# Patient Record
Sex: FEMALE | Race: WHITE
[De-identification: names, ages, dates, MRNs, and addresses within clinical notes are randomized per-mention and may not be internally consistent; named-entity substitution may affect disease eponyms.]

---

## 2019-04-08 ENCOUNTER — HOSPITAL ENCOUNTER (INPATIENT)
Dept: HOSPITAL 10 - L-D | Age: 24
LOS: 2 days | Discharge: HOME | End: 2019-04-10
Attending: OBSTETRICS & GYNECOLOGY | Admitting: OBSTETRICS & GYNECOLOGY
Payer: MEDICAID

## 2019-04-08 ENCOUNTER — HOSPITAL ENCOUNTER (INPATIENT)
Dept: HOSPITAL 91 - OBT | Age: 24
LOS: 2 days | Discharge: HOME | End: 2019-04-10
Payer: MEDICAID

## 2019-04-08 VITALS — SYSTOLIC BLOOD PRESSURE: 124 MMHG | HEART RATE: 68 BPM | RESPIRATION RATE: 17 BRPM | DIASTOLIC BLOOD PRESSURE: 84 MMHG

## 2019-04-08 VITALS
WEIGHT: 174.83 LBS | WEIGHT: 174.83 LBS | BODY MASS INDEX: 30.98 KG/M2 | HEIGHT: 63 IN | BODY MASS INDEX: 30.98 KG/M2 | HEIGHT: 63 IN

## 2019-04-08 VITALS — SYSTOLIC BLOOD PRESSURE: 122 MMHG | HEART RATE: 85 BPM | RESPIRATION RATE: 20 BRPM | DIASTOLIC BLOOD PRESSURE: 73 MMHG

## 2019-04-08 DIAGNOSIS — Z3A.39: ICD-10-CM

## 2019-04-08 LAB
ADD MAN DIFF?: NO
BASOPHIL #: 0.1 10^3/UL (ref 0–0.1)
BASOPHILS %: 0.6 % (ref 0–2)
EOSINOPHILS #: 0.1 10^3/UL (ref 0–0.5)
EOSINOPHILS %: 0.6 % (ref 0–7)
HEMATOCRIT: 38.8 % (ref 37–47)
HEMOGLOBIN: 13.4 G/DL (ref 12–16)
IMMATURE GRANS #M: 0.05 10^3/UL (ref 0–0.03)
IMMATURE GRANS % (M): 0.5 % (ref 0–0.43)
INR: 0.81
LYMPHOCYTES #: 2.1 10^3/UL (ref 0.8–2.9)
LYMPHOCYTES %: 19 % (ref 15–51)
MEAN CORPUSCULAR HEMOGLOBIN: 31.2 PG (ref 29–33)
MEAN CORPUSCULAR HGB CONC: 34.5 G/DL (ref 32–37)
MEAN CORPUSCULAR VOLUME: 90.2 FL (ref 82–101)
MEAN PLATELET VOLUME: 11.1 FL (ref 7.4–10.4)
MONOCYTE #: 0.5 10^3/UL (ref 0.3–0.9)
MONOCYTES %: 4.4 % (ref 0–11)
NEUTROPHIL #: 8.1 10^3/UL (ref 1.6–7.5)
NEUTROPHILS %: 74.9 % (ref 39–77)
NUCLEATED RED BLOOD CELLS #: 0 10^3/UL (ref 0–0)
NUCLEATED RED BLOOD CELLS%: 0 /100WBC (ref 0–0)
PARTIAL THROMBOPLASTIN TIME: 23.6 SEC (ref 23–35)
PLATELET COUNT: 206 10^3/UL (ref 140–415)
PROTIME: 11.3 SEC (ref 11.9–14.9)
PT RATIO: 0.9
RAPID PLASMA REAGIN: NONREACTIVE
RED BLOOD COUNT: 4.3 10^6/UL (ref 4.2–5.4)
RED CELL DISTRIBUTION WIDTH: 13.4 % (ref 11.5–14.5)
WHITE BLOOD COUNT: 10.8 10^3/UL (ref 4.8–10.8)

## 2019-04-08 PROCEDURE — 86900 BLOOD TYPING SEROLOGIC ABO: CPT

## 2019-04-08 PROCEDURE — 85730 THROMBOPLASTIN TIME PARTIAL: CPT

## 2019-04-08 PROCEDURE — G0463 HOSPITAL OUTPT CLINIC VISIT: HCPCS

## 2019-04-08 PROCEDURE — 86592 SYPHILIS TEST NON-TREP QUAL: CPT

## 2019-04-08 PROCEDURE — 90715 TDAP VACCINE 7 YRS/> IM: CPT

## 2019-04-08 PROCEDURE — 86850 RBC ANTIBODY SCREEN: CPT

## 2019-04-08 PROCEDURE — 86901 BLOOD TYPING SEROLOGIC RH(D): CPT

## 2019-04-08 PROCEDURE — 76818 FETAL BIOPHYS PROFILE W/NST: CPT

## 2019-04-08 PROCEDURE — 85610 PROTHROMBIN TIME: CPT

## 2019-04-08 PROCEDURE — 85025 COMPLETE CBC W/AUTO DIFF WBC: CPT

## 2019-04-08 RX ADMIN — PYRIDOXINE HYDROCHLORIDE SCH MLS/HR: 100 INJECTION, SOLUTION INTRAMUSCULAR; INTRAVENOUS at 21:33

## 2019-04-08 RX ADMIN — BUTORPHANOL TARTRATE 1 MG: 2 INJECTION, SOLUTION INTRAMUSCULAR; INTRAVENOUS at 16:19

## 2019-04-08 RX ADMIN — PYRIDOXINE HYDROCHLORIDE 1 MLS/HR: 100 INJECTION, SOLUTION INTRAMUSCULAR; INTRAVENOUS at 10:58

## 2019-04-08 RX ADMIN — AMPICILLIN 1 MLS/HR: 2 INJECTION, POWDER, FOR SOLUTION INTRAVENOUS at 14:58

## 2019-04-08 RX ADMIN — PYRIDOXINE HYDROCHLORIDE 1 MLS/HR: 100 INJECTION, SOLUTION INTRAMUSCULAR; INTRAVENOUS at 21:33

## 2019-04-08 RX ADMIN — Medication 1 MLS/HR: at 21:33

## 2019-04-08 RX ADMIN — Medication 1 MLS/HR: at 18:57

## 2019-04-08 RX ADMIN — Medication 1 MLS/HR: at 21:29

## 2019-04-08 RX ADMIN — PYRIDOXINE HYDROCHLORIDE 1 MLS/HR: 100 INJECTION, SOLUTION INTRAMUSCULAR; INTRAVENOUS at 18:09

## 2019-04-08 RX ADMIN — PYRIDOXINE HYDROCHLORIDE SCH MLS/HR: 100 INJECTION, SOLUTION INTRAMUSCULAR; INTRAVENOUS at 18:09

## 2019-04-08 RX ADMIN — PYRIDOXINE HYDROCHLORIDE 1 MLS/HR: 100 INJECTION, SOLUTION INTRAMUSCULAR; INTRAVENOUS at 20:25

## 2019-04-08 RX ADMIN — AMPICILLIN 1 MLS/HR: 1 INJECTION, POWDER, FOR SOLUTION INTRAMUSCULAR; INTRAVENOUS at 18:58

## 2019-04-08 RX ADMIN — PYRIDOXINE HYDROCHLORIDE SCH MLS/HR: 100 INJECTION, SOLUTION INTRAMUSCULAR; INTRAVENOUS at 20:25

## 2019-04-08 RX ADMIN — Medication SCH MLS/HR: at 21:29

## 2019-04-08 RX ADMIN — PYRIDOXINE HYDROCHLORIDE SCH MLS/HR: 100 INJECTION, SOLUTION INTRAMUSCULAR; INTRAVENOUS at 10:58

## 2019-04-08 RX ADMIN — Medication SCH MLS/HR: at 18:57

## 2019-04-08 NOTE — PAC
Date/Time of Note


Date/Time of Note


DATE: 4/8/19 


TIME: 20:14





Post-Anesthesia Notes


Post-Anesthesia Note


Last documented vital signs





Vital Signs


  Date      Temp  Pulse  Resp  B/P (MAP)   Pulse Ox  O2          O2 Flow    FiO2


Time                                                 Delivery    Rate


    4/8/19  97.9     85    20      122/73        98


     20:38                           (89)





Activity:  WNL


Respiratory function:  WNL


Cardiovascular function:  WNL


Mental status:  Baseline


Pain reasonably controlled:  Yes


Hydration appropriate:  Yes


Nausea/Vomiting absent:  No











MIREYA LUNA MD             Apr 8, 2019 20:15

## 2019-04-08 NOTE — LDN
Date/Time of Note


Date/Time of Note


DATE: 19 


TIME: 21:26





Delivery Summary


 of a vaible baby girl weighing 3800 grams or 8# 6 oz, 21" long, and with 


Apgars of 9/9.


Weeks of Gestation


39w 2d


Placenta Delivered:  Spontaneously


Meconium:  none


Episiotomy:  No


Perineal laceration:  0


Anesthesia type:  Epidural


Estimated blood loss:  100


Sponge & Needle done & correct:  Yes


All needle counts correct:  Yes


Any foreign bodies felt in the:  No (vagina)





Infant Delivery Information


Sex


Infant Sex:  female





Apgars


1 Minute:  9


5 Minute:  9





Suctioning


Nose & mouth suctioned at danis:  Yes


Delee suction performed:  No





Umbilical Cord


Umbilical cord with:  3 Vessels


Cord presentations:  no nuchal cord


Cord Blood was obtained:  Yes





Mother & Baby Disposition


Disposition


Mom & Baby to Maternity; Good:  Yes


Baby to NICU:  No











RODNEY CONNORS MD              2019 21:28

## 2019-04-08 NOTE — PREAC
Date/Time of Note


Date/Time of Note


DATE: 4/8/19 


TIME: 19:41





Anesthesia Eval and Record


Evaluation


Time Pre-Procedure Interview


DATE: 4/8/19 


TIME: 19:40


Age


23


Sex


female


NPO:  8 hrs


Preoperative diagnosis


labor pain


Planned procedure


epidural





Past Medical History


Past Medical History:  Includes


Pulm:  Asthma


Pregnancy:  Gestational diabetes (39)





Surgery & Anesthesia Issues


No known issue





Meds


Anticoagulation:  No


Beta Blocker within 24 hr:  No


Reason Beta Blocker not given:  Pt. not on B-Blocker


Reported Medications


Pnv95/Ferrous Fumarate/FA (Prenatal Formula Tablet) 1 Each Tablet, 1 EACH PO 


DAILY, TAB


   4/8/19





Current Medications


Lactated Ringer's 1,000 ml @  125 mls/hr Q8H IV  Last administered on 4/8/19at 


18:09; Admin Dose 125 MLS/HR;  Start 4/8/19 at 10:02


Butorphanol Tartrate (Stadol) 2 mg Q2H  PRN IV .PAIN Last administered on 


4/8/19at 16:19; Admin Dose 2 MG;  Start 4/8/19 at 10:30


Lidocaine (Xylocaine 1% (Mpf)) 30 ml ONCE PRN INJ .EPISIOTOMY;  Start 4/8/19 at 


10:30


Oxytocin/Lactated Ringer's 500 ml @  500 mls/hr ONCE POST PARTUM IV ;  Start 


4/8/19 at 10:30


Oxytocin/Lactated Ringer's 500 ml @  125 mls/hr POST PARTUM IV ;  Start 4/8/19 


at 10:30


Ibuprofen (Motrin) 600 mg ONCE PRN PO .PAIN 1-5;  Start 4/8/19 at 10:30


Oxytocin/Lactated Ringer's 500 ml @ 0 mls/hr ONCE PRN IV .VAGINAL BLEEDING;  


Start 4/8/19 at 10:30


Methylergonovine Maleate (Methergine) 0.2 mg ONCE PRN IM .VAGINAL BLEEDING;  


Start 4/8/19 at 10:30


Carboprost Tromethamine (Hemabate) 250 mcg ONCE PRN IM .VAGINAL BLEEDING;  Start


4/8/19 at 10:30


Misoprostol (Cytotec) 1,000 mcg ONCE PRN MI .VAGINAL BLEEDING;  Start 4/8/19 at 


10:30


Albuterol (Ventolin Hfa) 2 puff Q4H RESP THERAPY  PRN INH SHORTNESS OF BREATH;  


Start 4/8/19 at 11:00


Ampicillin 50 ml @  100 mls/hr Q4 IVPB  Last administered on 4/8/19at 18:58; 


Admin Dose 100 MLS/HR;  Start 4/8/19 at 19:00


Oxytocin/Lactated Ringer's 500 ml @ 0 mls/hr Q0M IV ;  Start 4/8/19 at 19:30


Oxytocin/Lactated Ringer's 500 ml @ 0 mls/hr FOR AUGMENTATION IV ;  Start 4/8/19


at 19:30


Meds reviewed:  Yes





Allergies


Coded Allergies:  


     No Known Drug Allergies (Verified  Allergy, Unknown, 4/8/19)


Allergies Reviewed:  Yes





Labs/Studies


Labs Reviewed:  Reviewed by anesthesiologist


Result Diagram:  


4/8/19 1045





Laboratory Tests


4/8/19 10:45











Blood Bank


                   Test
                         4/8/19
10:45


                   Antibody Screen               NEGATIVE


                   Blood Type                    B POSITIVE


                   Rh Immune Globulin Candidate  NO





Pregnancy test:  Positive


Studies:  ECG (n/a), CXR (n/a)





Pre-procedure Exam


Last vitals





Vital Signs


  Date      Temp  Pulse  Resp  B/P (MAP)   Pulse Ox  O2          O2 Flow    FiO2


Time                                                 Delivery    Rate


    4/8/19  97.9     85    20      122/73


     09:38                           (89)





Airway:  Adequate mouth opening


Mallampati:  Mallampati I


Teeth:  Normal


Lung:  Normal


Heart:  Normal





ASA Physical Status


ASA physical status:  2


Emergency:  None





Planned Anesthetic


Neuraxial:  Epidural





Pre-operative Attestations


Prior to commencing anesthesia and surgery, the patient was re-evaluated, there 


was verification of:


*The patient's identity


*The results of appropriate recent lab work and preoperative vital signs


*The above evaluation not changing prior to induction


*Anesthetic plan, risk benefits, alternative and complications discussed with 


patient/family; questions answered; patient/family understands, accepts and 


wishes to proceed.











MIREYA LUNA MD             Apr 8, 2019 19:42

## 2019-04-08 NOTE — HP
Date/Time of Note


Date/Time of Note


DATE: 19 


TIME: 21:28





OB - History


Hx of Present Pregnancy


Free Text/Dictation


23 y.o.  with an IUP at 39w 2d came in with spontaneous rupture of 


membranes and a cervical exam of 80%/3-4 cm/-3.


Estimated Due Date:  2019


:  3


Para:  2


Prenatal Care:  Good Care


Ultrasounds:  Normal mid trimester US


Obstetrical Complications:  None


Medical Complications:  Respiratory (asthma)





Past Family/Social History


*


Past Medical, Surgical, Family and Obstetric Histories reviewed from prenatal 


chart.


Blood Type:  B+


Rubella:  immune


RPR/VDRL:  Negative


GBS Status:  Negative


HBsAG:  Negative





OB  Admission Exam


Vital Signs


Vital Signs





Vital Signs


  Date      Temp  Pulse  Resp  B/P (MAP)   Pulse Ox  O2          O2 Flow    FiO2


Time                                                 Delivery    Rate


    19  97.9     85    20      122/73


     09:38                           (89)








Physical Exam


HEENT:  WNL


Heart:  Rhythm Normal


Lungs:  Clear


Abdomen:  WNL


Extremities:  Normal


Reflexes:  Normal


Cervical Dilatation:  3cm


Effacement:  Other (80%)


Station:  -3


Membranes:  Ruptured


Amniotic Fluid:  Clear


Fetal Heart Rate:  140's


Accelerations:  Accelerations Present


Decelerations:  No Decelerations


Varibility:  Moderate


Contractions on Admission:  >10 Minutes Apart


Intensity:  Mild


Last 72 hours Lab Results


                                    CBC & BMP


19 10:45











OB  Assessment/Plan


Reason for admission:  rupture of membranes


Plan:  Expectant Management











RODNEY CONNORS MD              2019 21:33

## 2019-04-09 VITALS — RESPIRATION RATE: 16 BRPM | HEART RATE: 74 BPM | SYSTOLIC BLOOD PRESSURE: 104 MMHG | DIASTOLIC BLOOD PRESSURE: 65 MMHG

## 2019-04-09 VITALS — DIASTOLIC BLOOD PRESSURE: 67 MMHG | SYSTOLIC BLOOD PRESSURE: 99 MMHG | HEART RATE: 68 BPM | RESPIRATION RATE: 19 BRPM

## 2019-04-09 VITALS — DIASTOLIC BLOOD PRESSURE: 77 MMHG | HEART RATE: 73 BPM | SYSTOLIC BLOOD PRESSURE: 117 MMHG | RESPIRATION RATE: 17 BRPM

## 2019-04-09 VITALS — HEART RATE: 87 BPM | DIASTOLIC BLOOD PRESSURE: 72 MMHG | SYSTOLIC BLOOD PRESSURE: 111 MMHG | RESPIRATION RATE: 16 BRPM

## 2019-04-09 VITALS — SYSTOLIC BLOOD PRESSURE: 100 MMHG | RESPIRATION RATE: 16 BRPM | DIASTOLIC BLOOD PRESSURE: 53 MMHG | HEART RATE: 87 BPM

## 2019-04-09 LAB
ADD MAN DIFF?: NO
BASOPHIL #: 0.1 10^3/UL (ref 0–0.1)
BASOPHILS %: 0.5 % (ref 0–2)
EOSINOPHILS #: 0.1 10^3/UL (ref 0–0.5)
EOSINOPHILS %: 0.5 % (ref 0–7)
HEMATOCRIT: 35.4 % (ref 37–47)
HEMOGLOBIN: 12.2 G/DL (ref 12–16)
IMMATURE GRANS #M: 0.06 10^3/UL (ref 0–0.03)
IMMATURE GRANS % (M): 0.5 % (ref 0–0.43)
LYMPHOCYTES #: 2 10^3/UL (ref 0.8–2.9)
LYMPHOCYTES %: 15.3 % (ref 15–51)
MEAN CORPUSCULAR HEMOGLOBIN: 31.2 PG (ref 29–33)
MEAN CORPUSCULAR HGB CONC: 34.5 G/DL (ref 32–37)
MEAN CORPUSCULAR VOLUME: 90.5 FL (ref 82–101)
MEAN PLATELET VOLUME: 11.7 FL (ref 7.4–10.4)
MONOCYTE #: 0.8 10^3/UL (ref 0.3–0.9)
MONOCYTES %: 6.2 % (ref 0–11)
NEUTROPHIL #: 9.8 10^3/UL (ref 1.6–7.5)
NEUTROPHILS %: 77 % (ref 39–77)
NUCLEATED RED BLOOD CELLS #: 0 10^3/UL (ref 0–0)
NUCLEATED RED BLOOD CELLS%: 0 /100WBC (ref 0–0)
PLATELET COUNT: 158 10^3/UL (ref 140–415)
RED BLOOD COUNT: 3.91 10^6/UL (ref 4.2–5.4)
RED CELL DISTRIBUTION WIDTH: 13.2 % (ref 11.5–14.5)
WHITE BLOOD COUNT: 12.8 10^3/UL (ref 4.8–10.8)

## 2019-04-09 RX ADMIN — IBUPROFEN 1 MG: 600 TABLET ORAL at 00:26

## 2019-04-09 RX ADMIN — IBUPROFEN SCH MG: 600 TABLET ORAL at 05:35

## 2019-04-09 RX ADMIN — IBUPROFEN SCH MG: 600 TABLET ORAL at 11:54

## 2019-04-09 RX ADMIN — IBUPROFEN 1 MG: 600 TABLET ORAL at 23:38

## 2019-04-09 RX ADMIN — IBUPROFEN 1 MG: 600 TABLET ORAL at 05:35

## 2019-04-09 RX ADMIN — PYRIDOXINE HYDROCHLORIDE SCH MLS/HR: 100 INJECTION, SOLUTION INTRAMUSCULAR; INTRAVENOUS at 05:33

## 2019-04-09 RX ADMIN — Medication PRN APPLIC: at 00:26

## 2019-04-09 RX ADMIN — IBUPROFEN SCH MG: 600 TABLET ORAL at 17:17

## 2019-04-09 RX ADMIN — MEASLES, MUMPS, AND RUBELLA VIRUS VACCINE LIVE 1 ML: 1000; 12500; 1000 INJECTION, POWDER, LYOPHILIZED, FOR SUSPENSION SUBCUTANEOUS at 15:17

## 2019-04-09 RX ADMIN — IBUPROFEN 1 MG: 600 TABLET ORAL at 17:17

## 2019-04-09 RX ADMIN — PYRIDOXINE HYDROCHLORIDE 1 MLS/HR: 100 INJECTION, SOLUTION INTRAMUSCULAR; INTRAVENOUS at 05:33

## 2019-04-09 RX ADMIN — Medication 1 APPLIC: at 17:16

## 2019-04-09 RX ADMIN — Medication PRN APPLIC: at 17:16

## 2019-04-09 RX ADMIN — IBUPROFEN SCH MG: 600 TABLET ORAL at 00:26

## 2019-04-09 RX ADMIN — IBUPROFEN 1 MG: 600 TABLET ORAL at 11:54

## 2019-04-09 RX ADMIN — IBUPROFEN SCH MG: 600 TABLET ORAL at 23:38

## 2019-04-09 RX ADMIN — Medication 1 APPLIC: at 00:26

## 2019-04-09 NOTE — PN
Date/Time of Note


Date/Time of Note


DATE: 4/9/19 


TIME: 19:00





Assessment/Plan


VTE Prophylaxis


Risk score (from Ns)>0 risk:  1


SCD applied (from Ns):  No


SCD contraindicated:  low risk/ambulating


Pharmacological prophylaxis:  NA/contraindicated


Pharm contraindication:  low risk/ambulating





Lines/Catheters


IV Catheter Type (from [facility]):  Peripheral IV





Assessment/Plan


Assessment/Plan


POST PARTUM DAY 1


HOME TOMORROW 


RETURN TO CLINIC IN 2 WEEKS 


CONTINUE WITH VITAMINS OD AND FERROUS SULFATE PO TID


DIET AS ADVISED 


COUNSELED


INSTRUCTED


CALL OFFICE IF THERE IS ANY PROBLEMS OR CONCERN


Result Diagram:  


4/9/19 0728





Results 24hrs





Laboratory Tests


               Test
                                4/9/19
07:28


               White Blood Count                         12.8  H


               Red Blood Count                           3.91  L


               Hemoglobin                                 12.2


               Hematocrit                                35.4  L


               Mean Corpuscular Volume                    90.5


               Mean Corpuscular Hemoglobin                31.2


               Mean Corpuscular Hemoglobin
Concent       34.5  



               Red Cell Distribution Width                13.2


               Platelet Count                             158  #


               Mean Platelet Volume                      11.7  H


               Immature Granulocytes %                  0.500  H


               Neutrophils %                              77.0


               Lymphocytes %                              15.3


               Monocytes %                                 6.2


               Eosinophils %                               0.5


               Basophils %                                 0.5


               Nucleated Red Blood Cells %                 0.0


               Immature Granulocytes #                  0.060  H


               Neutrophils #                              9.8  H


               Lymphocytes #                               2.0


               Monocytes #                                 0.8


               Eosinophils #                               0.1


               Basophils #                                 0.1


               Nucleated Red Blood Cells #                 0.0








Subjective


24 Hr Interval Summary


Free Text/Dictation


FEELS GOOD, GOOD URINE OUTPUT, GOOD BOWEL MOVEMENT





Exam/Review of Systems


Exam


Vitals





Vital Signs


  Date      Temp  Pulse  Resp  B/P (MAP)   Pulse Ox  O2          O2 Flow    FiO2


Time                                                 Delivery    Rate


    4/9/19  98.2     87    16      100/53            Room Air


     15:45                           (69)








Intake and Output





4/8/19 4/8/19 4/9/19





1515:00


23:00


07:00





IntakeIntake Total


375 ml


700 ml


200 ml





OutputOutput Total


1135 ml


1100 ml





BalanceBalance


375 ml


-435 ml


-900 ml











Exam


VITAL SIGNS STABLE: YES


AFEBRILE: YES


BREAST NOT ENGORGED, NON-TENDER, NO APPRECIABLE MASS: YES


LUNGS CLEAR, NO RALES, WHEEZES, RHONCHI: YES


SINUS RHYTHM WITHOUT MURMUR: YES


ABDOMEN: NON-TENDER


FUNDUS: BELOW UMBILICUS


BOWEL SOUNDS: PRESENT


UTERUS: FIRM


INTACT PERINEUM: YES


LOCHIA: LIGHT


DEEP TENDON REFLEXES: 0


EXTREMITIES: NO CALF TENDERNESS


EDEMA SCALE: NONE





Results


Results 24hrs





Laboratory Tests


               Test
                                4/9/19
07:28


               White Blood Count                         12.8  H


               Red Blood Count                           3.91  L


               Hemoglobin                                 12.2


               Hematocrit                                35.4  L


               Mean Corpuscular Volume                    90.5


               Mean Corpuscular Hemoglobin                31.2


               Mean Corpuscular Hemoglobin
Concent       34.5  



               Red Cell Distribution Width                13.2


               Platelet Count                             158  #


               Mean Platelet Volume                      11.7  H


               Immature Granulocytes %                  0.500  H


               Neutrophils %                              77.0


               Lymphocytes %                              15.3


               Monocytes %                                 6.2


               Eosinophils %                               0.5


               Basophils %                                 0.5


               Nucleated Red Blood Cells %                 0.0


               Immature Granulocytes #                  0.060  H


               Neutrophils #                              9.8  H


               Lymphocytes #                               2.0


               Monocytes #                                 0.8


               Eosinophils #                               0.1


               Basophils #                                 0.1


               Nucleated Red Blood Cells #                 0.0








Medications


Medication





Current Medications


Ibuprofen (Motrin) 600 mg Q6 PO  Last administered on 4/9/19at 17:17; Admin Dose


600 MG;  Start 4/9/19 at 00:00


Acetaminophen/ Hydrocodone Bitart (Norco (5/325)) 1 tab Q4H  PRN PO .PAIN 1-5;  


Start 4/8/19 at 22:00


Lanolin (Lanolin Hpa) 1 applic BEDSIDE MEDICATION  PRN TOP .NIPPLES Last 


administered on 4/9/19at 17:16; Admin Dose 1 APPLIC;  Start 4/8/19 at 22:00


Diphtheria/ Tetanus/Acell Pertussis (Adacel) 0.5 ml ONCE ONCE IM* ;  Start 


4/10/19 at 09:00;  Stop 4/10/19 at 09:01


Oxytocin/Lactated Ringer's 500 ml @ 0 mls/hr ONCE PRN IV .VAGINAL BLEEDING;  


Start 4/8/19 at 22:00


Methylergonovine Maleate (Methergine) 0.2 mg ONCE PRN IM .VAGINAL BLEEDING;  


Start 4/8/19 at 22:00


Carboprost Tromethamine (Hemabate) 250 mcg ONCE PRN IM .VAGINAL BLEEDING;  Start


4/8/19 at 22:00


Misoprostol (Cytotec) 1,000 mcg ONCE PRN RI .VAGINAL BLEEDING;  Start 4/8/19 at 


22:00











YUAN HOSKINS MD              Apr 9, 2019 19:01

## 2019-04-09 NOTE — PREOPHP
DATE OF ADMISSION: 2019

 

HISTORY OF PRESENT ILLNESS:  This is a 23-year-old lady,  3, para 2, EDC 2019 at 39 and 
2/7 weeks, admitted to labor and delivery area in labor.  She had a spontaneous ruptured bag of water
 at 3:00 a.m. on 2019 followed by mild irregular contraction.  She had prenatal care at Dr. Tenorio
's office at [facility] and the prenatal care was uneventful.

 

PAST PERSONAL HISTORY:  No history of diabetes, TB, asthma.

 

ALLERGIES:  NO ALLERGIES.

 

SOCIAL HISTORY:  The patient does not smoke.  She does not drink.

 

MEDICATIONS:  She does not take any drugs except her iron and vitamins.

 

GYNECOLOGIC HISTORY:  She had menarche at the age of 13, every 28 days interval, 3 to 4 days duration
, and moderate in amount.

 

FAMILY HISTORY:  Noncontributory.

 

OBSTETRICAL HISTORY:  She is  3, para 2.  Her first delivery was in , second in .  All
 normal deliveries.  The largest baby weighed 7 pounds.

 

REVIEW OF SYSTEMS:

CARDIOVASCULAR:  No chest pains.

RESPIRATORY:  No cough.

GASTROINTESTINAL:  No diarrhea, no vomiting.

GENITOURINARY:  No dysuria.

 

PHYSICAL EXAMINATION:

GENERAL:  Reveals a conscious, coherent lady and in no acute distress.

VITAL SIGNS:  Her blood pressure 120/80, pulse rate 80 per minute, respirations 16 per minute.

BREASTS, HEART AND LUNGS:  Within normal limits.

ABDOMEN:  Soft, fundic height 36 cm.  Fetal heart tones 140 per minute.

PELVIC:  Done by me at 1:22 p.m. on 2019 revealed the cervix to be 5 cm dilated, 100% effaced, 
station 0 with a bag of water ruptured.

EXTREMITIES:  No pedal edema.

 

ADMITTING DIAGNOSIS:  39 and 2/7 weeks intrauterine pregnancy in labor.  The plans of delivery were e
xplained to the patient and to go for vaginal delivery.  The risks, benefits, and alternatives to vag
inal delivery and  were explained.  She wanted to go for vaginal delivery.  Fetal scalp elec
trodes and IPC was inserted.  The patient was planned to be observed for progress of labor followed b
y Pitocin augmentation if needed.  Then, at around 6:00 p.m. 2019, she was noted to be 8 cm dil
ated and 100% effaced and she did not receive any pain medication.  Then, about a few minutes after I
 checked her, she wanted to go for labor epidural, which she did, and she progressed well and.  The c
ase was endorsed to Dr. Jimenez.

 

 

Dictated By: YUAN PATTERSON/KATIE

DD:    2019 08:59:31

DT:    2019 09:15:43

Conf#: [id number]

DID#:  [id number]

CC: YUAN HOSKINS MD; FRANCY TENORIO MD;*End*

## 2019-04-10 VITALS — HEART RATE: 61 BPM | SYSTOLIC BLOOD PRESSURE: 121 MMHG | DIASTOLIC BLOOD PRESSURE: 83 MMHG | RESPIRATION RATE: 17 BRPM

## 2019-04-10 VITALS — HEART RATE: 74 BPM | RESPIRATION RATE: 18 BRPM | DIASTOLIC BLOOD PRESSURE: 59 MMHG | SYSTOLIC BLOOD PRESSURE: 103 MMHG

## 2019-04-10 RX ADMIN — IBUPROFEN 1 MG: 600 TABLET ORAL at 05:38

## 2019-04-10 RX ADMIN — Medication 1 MG: at 09:54

## 2019-04-10 RX ADMIN — Medication 1 APPLIC: at 12:29

## 2019-04-10 RX ADMIN — Medication PRN APPLIC: at 12:29

## 2019-04-10 RX ADMIN — IBUPROFEN SCH MG: 600 TABLET ORAL at 12:28

## 2019-04-10 RX ADMIN — IBUPROFEN SCH MG: 600 TABLET ORAL at 05:38

## 2019-04-10 RX ADMIN — IBUPROFEN 1 MG: 600 TABLET ORAL at 12:28

## 2019-04-10 RX ADMIN — CLOSTRIDIUM TETANI TOXOID ANTIGEN (FORMALDEHYDE INACTIVATED), CORYNEBACTERIUM DIPHTHERIAE TOXOID ANTIGEN (FORMALDEHYDE INACTIVATED), BORDETELLA PERTUSSIS TOXOID ANTIGEN (GLUTARALDEHYDE INACTIVATED), BORDETELLA PERTUSSIS FILAMENTOUS HEMAGGLUTININ ANTIGEN (FORMALDEHYDE INACTIVATED), BORDETELLA PERTUSSIS PERTACTIN ANTIGEN, AND BORDETELLA PERTUSSIS FIMBRIAE 2/3 ANTIGEN 1 ML: 5; 2; 2.5; 5; 3; 5 INJECTION, SUSPENSION INTRAMUSCULAR at 09:54

## 2019-04-10 RX ADMIN — MAGNESIUM HYDROXIDE 1 ML: 400 SUSPENSION ORAL at 09:53

## 2019-04-11 NOTE — DELSUM
===================================

Delivery Summary A-C

===================================

Datetime Report Generated by CPN: 2019 14:37

   

   

===================================

DELIVERY PERSONNEL

===================================

   

Circulator:  Joseph Verduzcode

   

===================================

MATERNAL INFORMATION

===================================

   

Delivery Anesthesia:  Epidural

Medications in Delivery:  30 UNITS OF PITOCIN WITH LR

Delivery QBL (ml):  100

Placenta Cultured:  No

   

===================================

LABOR SUMMARY

===================================

   

EDC:  2019 00:00

No. Babies in Womb:  1

 Attempted:  No

Labor Anesthesia:  Epidural

   

===================================

LABOR INFORMATION

===================================

   

Onset of Labor:  2019 03:00

Complete Dilatation:  2019 20:15

Oxytocin:  N/A

Group B Beta Strep:  Negative

Antibiotics # of Doses:  2

Antibiotics Time of Last Dose:  2019 19:00

Steroids Given:  None

Reason Steroids Not Administered:  Not Applicable

   

===================================

MEMBRANES

===================================

   

Membranes Rupture Method:  Spontaneous

Rupture of Membranes:  2019 03:00

Length of Rupture (hr):  18.05

Amniotic Fluid Color:  Clear

Amniotic Fluid Amount:  Moderate

Amniotic Fluid Odor:  None

   

===================================

STAGES OF LABOR

===================================

   

Stage 1 hr:  17

Stage 1 min:  15

Stage 2 hr:  0

Stage 2 min:  48

Stage 3 hr:  0

Stage 3 min:  4

Total Time in Labor hr:  18

Total Time in Labor min:  7

   

===================================

VAGINAL DELIVERY

===================================

   

Episiotomy:  None

Laceration Extension:  N/A

Laceration Type:  None

Laceration Repair:  Not Applicable

Initial Vag Sponge Count:  10

Final Vag Sponge Count:  10

Initial Vag Sharps Count:  1

Final Vag Sharps Count:  1

Sponge Count Correct:  Yes; Vaginal Sweep Performed

Sharps Count Correct:  Yes

   

===================================

BABY A INFORMATION

===================================

   

Infant Delivery Date/Time:  2019 21:03

Method of Delivery:  Vaginal

Born in Route :  No

:  N/A

Forceps:  N/A

Vacuum Extraction:  N/A

Shoulder Dystocia :  N/A

   

===================================

SHOULDER DYSTOCIA BABY A

===================================

   

Infant Delivery Date/Time:  2019 21:03

   

===================================

PRESENTATION/POSITION BABY A

===================================

   

Presentation:  Cephalic

Cephalic Presentation:  Vertex

Vertex Position:  Left Occipital Anterior

Breech Presentation:  N/A

   

===================================

PLACENTA INFORMATION BABY A

===================================

   

Placenta Delivery Time :  2019 21:07

Placenta Method of Delivery:  Spontaneous

Placenta Status:  Delivered

   

===================================

APGAR SCORES BABY A

===================================

   

Heart Rate 1 min:  >100 bpm

Resp Effort 1 min:  Good Cry

Reflex Irritability 1 min:  Cough/Sneeze/Pulls Away

Muscle Tone 1 min:  Active Motion

Color 1 min:  Body Pink, Extremit Blue

APGAR SCORE 1 MIN:  9

Heart Rate 5 min:  >100 bpm

Resp Effort 5 min:  Good Cry

Reflex Irritability 5 min:  Cough/Sneeze/Pulls Away

Muscle Tone 5 min:  Active Motion

Color 5 min:  Body Pink, Extremit Blue

APGAR SCORE 5 MIN:  9

   

===================================

INFANT INFORMATION BABY A

===================================

   

Gestational Age at Delivery:  39.2

Gestational Status:  Full Term- 39- 40.6 Weeks

Infant Outcome :  Liveborn

Infant Condition :  Stable

Infant Sex:  Female

   

===================================

IDENTIFICATION/MEDS BABY A

===================================

   

ID Band Number:  [id number]

ID Band Location:  Right Leg; Left Arm



Sensor Applied:  Yes

Sensor Number:  [id number]

Sensor Location :  Cord Clamp

Vitamin K Given :  Not Given

Erythromycin Given:  Not Given

   

===================================

WEIGHT/LENGTH BABY A

===================================

   

Infant Birthweight (gm):  3800

Infant Weight (lb):  8

Infant Weight (oz):  6

Infant Length (in):  21.00

Infant Length (cm):  53.34

   

===================================

CORD INFORMATION BABY A

===================================

   

No. Cord Vessels:  3

Nuchal Cord :  N/A

Cord Blood Taken:  Yes

Infant Suction:  Mouth; Nose

   

===================================

ASSESSMENT BABY A

===================================

   

Infant Complications:  None

Physical Findings at Delivery:  Within Normal Limits

Infant Respirations:  Appears Normal

Neonatologist/ALS Called :  No

Infant Care By:  EDWIN ANDREWS

Transferred To:  Remains with Mother